# Patient Record
Sex: FEMALE | Race: WHITE | Employment: OTHER
[De-identification: names, ages, dates, MRNs, and addresses within clinical notes are randomized per-mention and may not be internally consistent; named-entity substitution may affect disease eponyms.]

---

## 2024-10-02 ENCOUNTER — APPOINTMENT (OUTPATIENT)
Facility: HOSPITAL | Age: 59
DRG: 872 | End: 2024-10-02
Payer: COMMERCIAL

## 2024-10-02 ENCOUNTER — HOSPITAL ENCOUNTER (INPATIENT)
Facility: HOSPITAL | Age: 59
LOS: 2 days | Discharge: HOME | DRG: 872 | End: 2024-10-04
Attending: EMERGENCY MEDICINE | Admitting: INTERNAL MEDICINE
Payer: COMMERCIAL

## 2024-10-02 DIAGNOSIS — A41.9 SEVERE SEPSIS (HCC): ICD-10-CM

## 2024-10-02 DIAGNOSIS — R11.2 NAUSEA VOMITING AND DIARRHEA: Primary | ICD-10-CM

## 2024-10-02 DIAGNOSIS — R19.7 NAUSEA VOMITING AND DIARRHEA: Primary | ICD-10-CM

## 2024-10-02 DIAGNOSIS — R10.84 GENERALIZED ABDOMINAL PAIN: ICD-10-CM

## 2024-10-02 DIAGNOSIS — R65.20 SEVERE SEPSIS (HCC): ICD-10-CM

## 2024-10-02 PROBLEM — K21.9 GASTROESOPHAGEAL REFLUX DISEASE WITHOUT ESOPHAGITIS: Status: ACTIVE | Noted: 2024-10-02

## 2024-10-02 PROBLEM — K52.9 ENTERITIS: Status: ACTIVE | Noted: 2024-10-02

## 2024-10-02 PROBLEM — E87.20 LACTIC ACIDOSIS: Status: ACTIVE | Noted: 2024-10-02

## 2024-10-02 LAB
ALBUMIN SERPL-MCNC: 3.7 G/DL (ref 3.4–5)
ALBUMIN/GLOB SERPL: 0.9 (ref 0.8–1.7)
ALP SERPL-CCNC: 89 U/L (ref 45–117)
ALT SERPL-CCNC: 37 U/L (ref 13–56)
ANION GAP SERPL CALC-SCNC: 11 MMOL/L (ref 3–18)
APPEARANCE UR: CLEAR
AST SERPL-CCNC: 25 U/L (ref 10–38)
BACTERIA URNS QL MICRO: ABNORMAL /HPF
BASOPHILS # BLD: 0 K/UL (ref 0–0.1)
BASOPHILS NFR BLD: 0 % (ref 0–2)
BILIRUB SERPL-MCNC: 1.9 MG/DL (ref 0.2–1)
BILIRUB UR QL: NEGATIVE
BUN SERPL-MCNC: 25 MG/DL (ref 7–18)
BUN/CREAT SERPL: 17 (ref 12–20)
CALCIUM SERPL-MCNC: 9.7 MG/DL (ref 8.5–10.1)
CHLORIDE SERPL-SCNC: 105 MMOL/L (ref 100–111)
CO2 SERPL-SCNC: 24 MMOL/L (ref 21–32)
COLOR UR: ABNORMAL
CREAT SERPL-MCNC: 1.44 MG/DL (ref 0.6–1.3)
DIFFERENTIAL METHOD BLD: ABNORMAL
EKG ATRIAL RATE: 108 BPM
EKG DIAGNOSIS: NORMAL
EKG P AXIS: 16 DEGREES
EKG P-R INTERVAL: 156 MS
EKG Q-T INTERVAL: 358 MS
EKG QRS DURATION: 72 MS
EKG QTC CALCULATION (BAZETT): 479 MS
EKG R AXIS: 38 DEGREES
EKG T AXIS: 63 DEGREES
EKG VENTRICULAR RATE: 108 BPM
EOSINOPHIL # BLD: 0 K/UL (ref 0–0.4)
EOSINOPHIL NFR BLD: 0 % (ref 0–5)
EPITH CASTS URNS QL MICRO: ABNORMAL /LPF (ref 0–5)
ERYTHROCYTE [DISTWIDTH] IN BLOOD BY AUTOMATED COUNT: 14.6 % (ref 11.6–14.5)
GLOBULIN SER CALC-MCNC: 3.9 G/DL (ref 2–4)
GLUCOSE SERPL-MCNC: 140 MG/DL (ref 74–99)
GLUCOSE UR STRIP.AUTO-MCNC: NEGATIVE MG/DL
HCT VFR BLD AUTO: 52.4 % (ref 35–45)
HGB BLD-MCNC: 17.7 G/DL (ref 12–16)
HGB UR QL STRIP: NEGATIVE
IMM GRANULOCYTES # BLD AUTO: 0 K/UL (ref 0–0.04)
IMM GRANULOCYTES NFR BLD AUTO: 0 % (ref 0–0.5)
KETONES UR QL STRIP.AUTO: NEGATIVE MG/DL
LACTATE BLD-SCNC: 4.14 MMOL/L (ref 0.4–2)
LACTATE BLD-SCNC: 5.09 MMOL/L (ref 0.4–2)
LACTATE SERPL-SCNC: 2.5 MMOL/L (ref 0.4–2)
LACTATE SERPL-SCNC: 3 MMOL/L (ref 0.4–2)
LACTATE SERPL-SCNC: 4.5 MMOL/L (ref 0.4–2)
LEUKOCYTE ESTERASE UR QL STRIP.AUTO: NEGATIVE
LIPASE SERPL-CCNC: 32 U/L (ref 13–75)
LYMPHOCYTES # BLD: 4.1 K/UL (ref 0.9–3.6)
LYMPHOCYTES NFR BLD: 14 % (ref 21–52)
MCH RBC QN AUTO: 30.2 PG (ref 24–34)
MCHC RBC AUTO-ENTMCNC: 33.8 G/DL (ref 31–37)
MCV RBC AUTO: 89.3 FL (ref 78–100)
MONOCYTES # BLD: 1.8 K/UL (ref 0.05–1.2)
MONOCYTES NFR BLD: 6 % (ref 3–10)
NEUTS BAND NFR BLD MANUAL: 6 %
NEUTS SEG # BLD: 23.3 K/UL (ref 1.8–8)
NEUTS SEG NFR BLD: 74 % (ref 40–73)
NITRITE UR QL STRIP.AUTO: NEGATIVE
NRBC # BLD: 0 K/UL (ref 0–0.01)
NRBC BLD-RTO: 0 PER 100 WBC
PH UR STRIP: 5 (ref 5–8)
PLATELET # BLD AUTO: 397 K/UL (ref 135–420)
PLATELET COMMENT: ABNORMAL
PMV BLD AUTO: 9.5 FL (ref 9.2–11.8)
POTASSIUM SERPL-SCNC: 3.8 MMOL/L (ref 3.5–5.5)
PROT SERPL-MCNC: 7.6 G/DL (ref 6.4–8.2)
PROT UR STRIP-MCNC: 30 MG/DL
RBC # BLD AUTO: 5.87 M/UL (ref 4.2–5.3)
RBC #/AREA URNS HPF: ABNORMAL /HPF (ref 0–5)
RBC MORPH BLD: ABNORMAL
SODIUM SERPL-SCNC: 140 MMOL/L (ref 136–145)
SP GR UR REFRACTOMETRY: >1.03 (ref 1–1.03)
UROBILINOGEN UR QL STRIP.AUTO: 0.2 EU/DL (ref 0.2–1)
WBC # BLD AUTO: 29.2 K/UL (ref 4.6–13.2)
WBC URNS QL MICRO: ABNORMAL /HPF (ref 0–4)

## 2024-10-02 PROCEDURE — 83605 ASSAY OF LACTIC ACID: CPT

## 2024-10-02 PROCEDURE — 94761 N-INVAS EAR/PLS OXIMETRY MLT: CPT

## 2024-10-02 PROCEDURE — 96375 TX/PRO/DX INJ NEW DRUG ADDON: CPT

## 2024-10-02 PROCEDURE — 81001 URINALYSIS AUTO W/SCOPE: CPT

## 2024-10-02 PROCEDURE — 1100000003 HC PRIVATE W/ TELEMETRY

## 2024-10-02 PROCEDURE — 36415 COLL VENOUS BLD VENIPUNCTURE: CPT

## 2024-10-02 PROCEDURE — 6370000000 HC RX 637 (ALT 250 FOR IP): Performed by: EMERGENCY MEDICINE

## 2024-10-02 PROCEDURE — 6370000000 HC RX 637 (ALT 250 FOR IP): Performed by: INTERNAL MEDICINE

## 2024-10-02 PROCEDURE — 2580000003 HC RX 258: Performed by: INTERNAL MEDICINE

## 2024-10-02 PROCEDURE — 6370000000 HC RX 637 (ALT 250 FOR IP): Performed by: PHYSICIAN ASSISTANT

## 2024-10-02 PROCEDURE — 80053 COMPREHEN METABOLIC PANEL: CPT

## 2024-10-02 PROCEDURE — 2580000003 HC RX 258: Performed by: STUDENT IN AN ORGANIZED HEALTH CARE EDUCATION/TRAINING PROGRAM

## 2024-10-02 PROCEDURE — 87324 CLOSTRIDIUM AG IA: CPT

## 2024-10-02 PROCEDURE — 6360000002 HC RX W HCPCS: Performed by: INTERNAL MEDICINE

## 2024-10-02 PROCEDURE — 6360000004 HC RX CONTRAST MEDICATION: Performed by: EMERGENCY MEDICINE

## 2024-10-02 PROCEDURE — 74177 CT ABD & PELVIS W/CONTRAST: CPT

## 2024-10-02 PROCEDURE — 87449 NOS EACH ORGANISM AG IA: CPT

## 2024-10-02 PROCEDURE — 2580000003 HC RX 258: Performed by: EMERGENCY MEDICINE

## 2024-10-02 PROCEDURE — 87506 IADNA-DNA/RNA PROBE TQ 6-11: CPT

## 2024-10-02 PROCEDURE — 93005 ELECTROCARDIOGRAM TRACING: CPT | Performed by: STUDENT IN AN ORGANIZED HEALTH CARE EDUCATION/TRAINING PROGRAM

## 2024-10-02 PROCEDURE — 99223 1ST HOSP IP/OBS HIGH 75: CPT | Performed by: SURGERY

## 2024-10-02 PROCEDURE — 6370000000 HC RX 637 (ALT 250 FOR IP): Performed by: STUDENT IN AN ORGANIZED HEALTH CARE EDUCATION/TRAINING PROGRAM

## 2024-10-02 PROCEDURE — 93010 ELECTROCARDIOGRAM REPORT: CPT | Performed by: INTERNAL MEDICINE

## 2024-10-02 PROCEDURE — 83690 ASSAY OF LIPASE: CPT

## 2024-10-02 PROCEDURE — 85025 COMPLETE CBC W/AUTO DIFF WBC: CPT

## 2024-10-02 PROCEDURE — 96365 THER/PROPH/DIAG IV INF INIT: CPT

## 2024-10-02 PROCEDURE — 82705 FATS/LIPIDS FECES QUAL: CPT

## 2024-10-02 PROCEDURE — 6360000002 HC RX W HCPCS: Performed by: EMERGENCY MEDICINE

## 2024-10-02 PROCEDURE — 89055 LEUKOCYTE ASSESSMENT FECAL: CPT

## 2024-10-02 PROCEDURE — 99285 EMERGENCY DEPT VISIT HI MDM: CPT

## 2024-10-02 PROCEDURE — 84999 UNLISTED CHEMISTRY PROCEDURE: CPT

## 2024-10-02 PROCEDURE — 2580000003 HC RX 258: Performed by: PHYSICIAN ASSISTANT

## 2024-10-02 PROCEDURE — 87086 URINE CULTURE/COLONY COUNT: CPT

## 2024-10-02 PROCEDURE — 87040 BLOOD CULTURE FOR BACTERIA: CPT

## 2024-10-02 PROCEDURE — 71045 X-RAY EXAM CHEST 1 VIEW: CPT

## 2024-10-02 RX ORDER — ONDANSETRON 2 MG/ML
4 INJECTION INTRAMUSCULAR; INTRAVENOUS
Status: ACTIVE | OUTPATIENT
Start: 2024-10-02 | End: 2024-10-02

## 2024-10-02 RX ORDER — ENOXAPARIN SODIUM 100 MG/ML
40 INJECTION SUBCUTANEOUS DAILY
Status: DISCONTINUED | OUTPATIENT
Start: 2024-10-03 | End: 2024-10-04 | Stop reason: HOSPADM

## 2024-10-02 RX ORDER — KETOROLAC TROMETHAMINE 15 MG/ML
15 INJECTION, SOLUTION INTRAMUSCULAR; INTRAVENOUS ONCE
Status: COMPLETED | OUTPATIENT
Start: 2024-10-02 | End: 2024-10-02

## 2024-10-02 RX ORDER — DICYCLOMINE HYDROCHLORIDE 10 MG/1
20 CAPSULE ORAL ONCE
Status: DISCONTINUED | OUTPATIENT
Start: 2024-10-02 | End: 2024-10-02

## 2024-10-02 RX ORDER — DICYCLOMINE HYDROCHLORIDE 10 MG/1
20 CAPSULE ORAL
Status: DISCONTINUED | OUTPATIENT
Start: 2024-10-02 | End: 2024-10-04 | Stop reason: HOSPADM

## 2024-10-02 RX ORDER — SODIUM CHLORIDE, SODIUM LACTATE, POTASSIUM CHLORIDE, AND CALCIUM CHLORIDE .6; .31; .03; .02 G/100ML; G/100ML; G/100ML; G/100ML
1000 INJECTION, SOLUTION INTRAVENOUS ONCE
Status: COMPLETED | OUTPATIENT
Start: 2024-10-02 | End: 2024-10-02

## 2024-10-02 RX ORDER — ONDANSETRON 2 MG/ML
4 INJECTION INTRAMUSCULAR; INTRAVENOUS EVERY 6 HOURS PRN
Status: DISCONTINUED | OUTPATIENT
Start: 2024-10-02 | End: 2024-10-04 | Stop reason: HOSPADM

## 2024-10-02 RX ORDER — METOCLOPRAMIDE HYDROCHLORIDE 5 MG/ML
10 INJECTION INTRAMUSCULAR; INTRAVENOUS
Status: COMPLETED | OUTPATIENT
Start: 2024-10-02 | End: 2024-10-02

## 2024-10-02 RX ORDER — ACETAMINOPHEN 325 MG/1
650 TABLET ORAL EVERY 6 HOURS PRN
Status: DISCONTINUED | OUTPATIENT
Start: 2024-10-02 | End: 2024-10-04 | Stop reason: HOSPADM

## 2024-10-02 RX ORDER — MIDODRINE HYDROCHLORIDE 10 MG/1
10 TABLET ORAL ONCE
Status: COMPLETED | OUTPATIENT
Start: 2024-10-02 | End: 2024-10-02

## 2024-10-02 RX ORDER — 0.9 % SODIUM CHLORIDE 0.9 %
1000 INTRAVENOUS SOLUTION INTRAVENOUS ONCE
Status: COMPLETED | OUTPATIENT
Start: 2024-10-02 | End: 2024-10-02

## 2024-10-02 RX ORDER — ONDANSETRON 2 MG/ML
4 INJECTION INTRAMUSCULAR; INTRAVENOUS
Status: COMPLETED | OUTPATIENT
Start: 2024-10-02 | End: 2024-10-02

## 2024-10-02 RX ORDER — SODIUM CHLORIDE 9 MG/ML
INJECTION, SOLUTION INTRAVENOUS PRN
Status: DISCONTINUED | OUTPATIENT
Start: 2024-10-02 | End: 2024-10-04 | Stop reason: HOSPADM

## 2024-10-02 RX ORDER — SODIUM CHLORIDE, SODIUM LACTATE, POTASSIUM CHLORIDE, CALCIUM CHLORIDE 600; 310; 30; 20 MG/100ML; MG/100ML; MG/100ML; MG/100ML
INJECTION, SOLUTION INTRAVENOUS CONTINUOUS
Status: DISCONTINUED | OUTPATIENT
Start: 2024-10-02 | End: 2024-10-04

## 2024-10-02 RX ORDER — SODIUM CHLORIDE, SODIUM LACTATE, POTASSIUM CHLORIDE, AND CALCIUM CHLORIDE .6; .31; .03; .02 G/100ML; G/100ML; G/100ML; G/100ML
500 INJECTION, SOLUTION INTRAVENOUS ONCE
Status: DISCONTINUED | OUTPATIENT
Start: 2024-10-02 | End: 2024-10-03

## 2024-10-02 RX ORDER — IOPAMIDOL 612 MG/ML
70 INJECTION, SOLUTION INTRAVASCULAR
Status: COMPLETED | OUTPATIENT
Start: 2024-10-02 | End: 2024-10-02

## 2024-10-02 RX ORDER — SODIUM CHLORIDE 0.9 % (FLUSH) 0.9 %
5-40 SYRINGE (ML) INJECTION EVERY 12 HOURS SCHEDULED
Status: DISCONTINUED | OUTPATIENT
Start: 2024-10-02 | End: 2024-10-04 | Stop reason: HOSPADM

## 2024-10-02 RX ORDER — POTASSIUM CHLORIDE 7.45 MG/ML
10 INJECTION INTRAVENOUS PRN
Status: DISCONTINUED | OUTPATIENT
Start: 2024-10-02 | End: 2024-10-04 | Stop reason: HOSPADM

## 2024-10-02 RX ORDER — METOCLOPRAMIDE HYDROCHLORIDE 5 MG/ML
10 INJECTION INTRAMUSCULAR; INTRAVENOUS EVERY 6 HOURS
Status: DISCONTINUED | OUTPATIENT
Start: 2024-10-02 | End: 2024-10-04 | Stop reason: HOSPADM

## 2024-10-02 RX ORDER — ONDANSETRON 4 MG/1
4 TABLET, ORALLY DISINTEGRATING ORAL
Status: COMPLETED | OUTPATIENT
Start: 2024-10-02 | End: 2024-10-02

## 2024-10-02 RX ORDER — MAGNESIUM SULFATE IN WATER 40 MG/ML
2000 INJECTION, SOLUTION INTRAVENOUS PRN
Status: DISCONTINUED | OUTPATIENT
Start: 2024-10-02 | End: 2024-10-04 | Stop reason: HOSPADM

## 2024-10-02 RX ORDER — SODIUM CHLORIDE 0.9 % (FLUSH) 0.9 %
5-40 SYRINGE (ML) INJECTION PRN
Status: DISCONTINUED | OUTPATIENT
Start: 2024-10-02 | End: 2024-10-04 | Stop reason: HOSPADM

## 2024-10-02 RX ORDER — LEVOFLOXACIN 500 MG/1
500 TABLET, FILM COATED ORAL EVERY 24 HOURS
Status: DISCONTINUED | OUTPATIENT
Start: 2024-10-02 | End: 2024-10-04 | Stop reason: HOSPADM

## 2024-10-02 RX ORDER — ACETAMINOPHEN 650 MG/1
650 SUPPOSITORY RECTAL EVERY 6 HOURS PRN
Status: DISCONTINUED | OUTPATIENT
Start: 2024-10-02 | End: 2024-10-04 | Stop reason: HOSPADM

## 2024-10-02 RX ORDER — POLYETHYLENE GLYCOL 3350 17 G/17G
17 POWDER, FOR SOLUTION ORAL DAILY PRN
Status: DISCONTINUED | OUTPATIENT
Start: 2024-10-02 | End: 2024-10-04 | Stop reason: HOSPADM

## 2024-10-02 RX ORDER — ONDANSETRON 4 MG/1
4 TABLET, ORALLY DISINTEGRATING ORAL EVERY 8 HOURS PRN
Status: DISCONTINUED | OUTPATIENT
Start: 2024-10-02 | End: 2024-10-04 | Stop reason: HOSPADM

## 2024-10-02 RX ADMIN — PANTOPRAZOLE SODIUM 40 MG: 40 INJECTION, POWDER, FOR SOLUTION INTRAVENOUS at 13:28

## 2024-10-02 RX ADMIN — SODIUM CHLORIDE, POTASSIUM CHLORIDE, SODIUM LACTATE AND CALCIUM CHLORIDE: 600; 310; 30; 20 INJECTION, SOLUTION INTRAVENOUS at 21:22

## 2024-10-02 RX ADMIN — SODIUM CHLORIDE 1000 ML: 9 INJECTION, SOLUTION INTRAVENOUS at 04:55

## 2024-10-02 RX ADMIN — METOCLOPRAMIDE 10 MG: 5 INJECTION, SOLUTION INTRAMUSCULAR; INTRAVENOUS at 05:28

## 2024-10-02 RX ADMIN — DICYCLOMINE HYDROCHLORIDE 20 MG: 10 CAPSULE ORAL at 21:18

## 2024-10-02 RX ADMIN — ONDANSETRON 4 MG: 4 TABLET, ORALLY DISINTEGRATING ORAL at 02:52

## 2024-10-02 RX ADMIN — PIPERACILLIN AND TAZOBACTAM 4500 MG: 4; .5 INJECTION, POWDER, FOR SOLUTION INTRAVENOUS at 07:33

## 2024-10-02 RX ADMIN — SODIUM CHLORIDE, POTASSIUM CHLORIDE, SODIUM LACTATE AND CALCIUM CHLORIDE 1000 ML: 600; 310; 30; 20 INJECTION, SOLUTION INTRAVENOUS at 23:07

## 2024-10-02 RX ADMIN — MIDODRINE HYDROCHLORIDE 10 MG: 10 TABLET ORAL at 09:28

## 2024-10-02 RX ADMIN — DICYCLOMINE HYDROCHLORIDE 20 MG: 10 CAPSULE ORAL at 13:36

## 2024-10-02 RX ADMIN — SODIUM CHLORIDE, POTASSIUM CHLORIDE, SODIUM LACTATE AND CALCIUM CHLORIDE 1000 ML: 600; 310; 30; 20 INJECTION, SOLUTION INTRAVENOUS at 09:28

## 2024-10-02 RX ADMIN — ONDANSETRON 4 MG: 2 INJECTION INTRAMUSCULAR; INTRAVENOUS at 03:35

## 2024-10-02 RX ADMIN — SODIUM CHLORIDE, PRESERVATIVE FREE 10 ML: 5 INJECTION INTRAVENOUS at 21:19

## 2024-10-02 RX ADMIN — ALUMINUM HYDROXIDE, MAGNESIUM HYDROXIDE, AND SIMETHICONE 40 ML: 1200; 120; 1200 SUSPENSION ORAL at 05:26

## 2024-10-02 RX ADMIN — METOCLOPRAMIDE HYDROCHLORIDE 10 MG: 5 INJECTION INTRAMUSCULAR; INTRAVENOUS at 21:18

## 2024-10-02 RX ADMIN — METOCLOPRAMIDE HYDROCHLORIDE 10 MG: 5 INJECTION INTRAMUSCULAR; INTRAVENOUS at 13:42

## 2024-10-02 RX ADMIN — SODIUM CHLORIDE, POTASSIUM CHLORIDE, SODIUM LACTATE AND CALCIUM CHLORIDE: 600; 310; 30; 20 INJECTION, SOLUTION INTRAVENOUS at 23:58

## 2024-10-02 RX ADMIN — IOPAMIDOL 70 ML: 612 INJECTION, SOLUTION INTRAVENOUS at 06:08

## 2024-10-02 RX ADMIN — SODIUM CHLORIDE, POTASSIUM CHLORIDE, SODIUM LACTATE AND CALCIUM CHLORIDE 1000 ML: 600; 310; 30; 20 INJECTION, SOLUTION INTRAVENOUS at 07:32

## 2024-10-02 RX ADMIN — WATER 2000 MG: 1 INJECTION INTRAMUSCULAR; INTRAVENOUS; SUBCUTANEOUS at 13:31

## 2024-10-02 RX ADMIN — LEVOFLOXACIN 500 MG: 500 TABLET, FILM COATED ORAL at 13:37

## 2024-10-02 RX ADMIN — SODIUM CHLORIDE, POTASSIUM CHLORIDE, SODIUM LACTATE AND CALCIUM CHLORIDE: 600; 310; 30; 20 INJECTION, SOLUTION INTRAVENOUS at 13:46

## 2024-10-02 RX ADMIN — SODIUM CHLORIDE 1000 ML: 9 INJECTION, SOLUTION INTRAVENOUS at 03:15

## 2024-10-02 RX ADMIN — KETOROLAC TROMETHAMINE 15 MG: 15 INJECTION, SOLUTION INTRAMUSCULAR; INTRAVENOUS at 03:35

## 2024-10-02 ASSESSMENT — PAIN SCALES - GENERAL
PAINLEVEL_OUTOF10: 0
PAINLEVEL_OUTOF10: 8
PAINLEVEL_OUTOF10: 2
PAINLEVEL_OUTOF10: 5

## 2024-10-02 ASSESSMENT — PAIN DESCRIPTION - LOCATION
LOCATION: ABDOMEN

## 2024-10-02 NOTE — ED TRIAGE NOTES
Patient arrived EMS from home with c/o n/v/d since 1800 yesterday. Per EMS patient ate oysters Monday night. Bg 132pta. Upon arrival patient alert and oriented x 4. Denies CP, SOB. Afebrile.

## 2024-10-02 NOTE — CONSULTS
General Surgery Consult    Arlene Fuentes  Admit date: 10/2/2024    MRN: [id number]     : 1965     Age: 59 y.o.        Attending Physician: Pamela Yancey MD, FACS      History of Present Illness:      Arlene Fuentes is a 59 y.o. female who I was consulted by the emergency room for evaluation of abdominal pain and picture of severe enteritis.  The patient stated that has been having abdominal pain for 24 hours.  She said that the pain is generalized and crampy.  In the ER she was found to have tachycardia and severe leukocytosis and elevated lactate and she had a CT scan of abdomen pelvis that showed significant thickening of the mid jejunum and terminal ileum representing severe enteritis.  The patient will be admitted to either ICU or stepdown unit on the medicine service but were asked to be on board in case the patient get worse or if there is evidence of ischemia.  When I saw the patient in the ER she stated that she feels slightly better but she still having significant abdominal pain.    There are no problems to display for this patient.    History reviewed. No pertinent past medical history.   History reviewed. No pertinent surgical history.   Social History     Tobacco Use    Smoking status: Not on file    Smokeless tobacco: Not on file   Substance Use Topics    Alcohol use: Not on file      Social History     Tobacco Use   Smoking Status Not on file   Smokeless Tobacco Not on file     History reviewed. No pertinent family history.   Current Facility-Administered Medications   Medication Dose Route Frequency    ondansetron (ZOFRAN) injection 4 mg  4 mg IntraVENous NOW    lactated ringers bolus 1,000 mL  1,000 mL IntraVENous Once     No current outpatient medications on file.      No Known Allergies     Review of Systems:  Constitutional: negative  Eyes: negative  Ears, Nose, Mouth, Throat, and Face: negative  Respiratory: negative  Cardiovascular: negative  Gastrointestinal: positive for

## 2024-10-02 NOTE — ED NOTES
TRANSFER - OUT REPORT:    Verbal report given to BRENDEN Baez on Arlene Fuentes  being transferred to  for routine progression of patient care       Report consisted of patient's Situation, Background, Assessment and   Recommendations(SBAR).     Information from the following report(s) ED SBAR, Adult Overview, and Recent Results was reviewed with the receiving nurse.    [facility] Fall Assessment:    Presents to emergency department  because of falls (Syncope, seizure, or loss of consciousness): No  Age > 70: No  Altered Mental Status, Intoxication with alcohol or substance confusion (Disorientation, impaired judgment, poor safety awaremess, or inability to follow instructions): No  Impaired Mobility: Ambulates or transfers with assistive devices or assistance; Unable to ambulate or transer.: No  Nursing Judgement: No          Lines:   Peripheral IV 10/02/24 Left Antecubital (Active)        Opportunity for questions and clarification was provided.      Patient transported with:  Tech

## 2024-10-02 NOTE — ED PROVIDER NOTES
6:03 AM  Dr. Adams taken for patient care.  Patient with symptoms of nausea, vomiting, diarrhea that began yesterday after eating oysters.  Endorsing abdominal cramping. Upon review patient found to have a leukocytosis of 29.2, hemoglobin 17.7, and creatinine 1.44 without known baseline.  Patient's blood pressures found to be soft while here in the emergency department.  She is also tachycardic.  Patient with history of C. difficile and therefore placed C. difficile assay for further evaluation as etiology.  Also will obtain a CT scan due to her leukocytosis and meeting sepsis criteria.  Believe patient would benefit from inpatient treatment and evaluation.  Will continue to monitor patient.      8:23 AM  Patient CT scan of my read without evidence of small bowel structure.  Radiology read showing inflammation to a large portion of the small bowel consistent with enteritis.  Spoke with hospitalist who believes patient should be possibly ICU admission due to softer blood pressures and concerning symptoms/CT scan findings.  Will reach out to ICU.    8:53 AM  Spoke to ICU who recommends further IV fluid resuscitation and consults to ID and general surgery due to CT scan findings and significant leukocytosis.  Will reach out to both for further recommendation.    09:06 AM  Spoke with general surgery who will consult upon admission.  No further recommendations at this time.    11:01 AM  Patient's blood pressure following third liter of IV fluid as well as midodrine has been stable with systolic greater than 100 over the past hour.  Updated ICU who believes patient appropriate for stepdown.  Will admit patient to the hospital for further treatment and evaluation.    11:30 AM  Spoke with ID who will be placing new IV antibiotic orders at this time.  Spoke with hospitalist who agrees to admit patient at this time.        Critical Care Time:  The services I provided to this patient were to treat and/or prevent clinically 
injection 4 mg (4 mg IntraVENous Given 10/2/24 0335)   ketorolac (TORADOL) injection 15 mg (15 mg IntraVENous Given 10/2/24 0335)   sodium chloride 0.9 % bolus 1,000 mL (0 mLs IntraVENous Stopped 10/2/24 0619)   metoclopramide (REGLAN) injection 10 mg (10 mg IntraVENous Given 10/2/24 0528)   mylanta/viscous lidocaine (GI COCKTAIL) (40 mLs Oral Given 10/2/24 0526)   iopamidol (ISOVUE-300) 61 % injection 70 mL (70 mLs IntraVENous Given 10/2/24 0608)       Final Diagnosis:  1. Nausea vomiting and diarrhea    2. SIRS (systemic inflammatory response syndrome) (HCC)    3. Generalized abdominal pain        Disposition:  Destination: Probable admission    Discharge Rx:   New Prescriptions    No medications on file         Dictation disclaimer: Please note that this dictation was completed with [email or web address], the [employer] voice recognition software. Quite often unanticipated grammatical, syntax, homophones, and other interpretive errors are inadvertently transcribed by the computer software. Please disregard these errors. Please excuse any errors that have escaped final proofreading.     Von Grace D.O.  Emergency Physician   [facility]             [place]on Grace,   10/02/24 0645    
PAST MEDICAL HISTORY:  ASHD (arteriosclerotic heart disease)     BPH (benign prostatic hyperplasia)     CAD (coronary artery disease)     CKD (chronic kidney disease)     Diabetes Mellitus Type II, Uncontrolled     Diabetic retinopathy     Dyslipidemia     GERD (gastroesophageal reflux disease)     Gout     Hepatitis     HTN (hypertension)     Ischemic cardiomyopathy     Myocardial infarction     Nephrolithiasis     Pulmonary hypertension     s/p Angioplasty with Stent     Vitamin D deficiency

## 2024-10-02 NOTE — ED NOTES
Started an IV, via ultrasound guidance   Sent blood work to lab    Attempted to connected pt to monitor. Pt refused. Pt stated that she is in a lot of pain and is very dry. Nurse notified

## 2024-10-02 NOTE — CONSULTS
[facility]  (A Division of [facility])      Consultation Note      Date of Admission: 10/2/2024    Date of Note: 10/2/2024      Reason for Referral: sepsis, enteritis  Referring Physician: Dr. Bryan Melvin from this admission:   10/2 blood cultures: Pending  10/2 C. difficile: Pending  10/2 enteric pathogen panel: Pending  10/2 urine culture: Sent    Current Antimicrobials:    Prior Antimicrobials:  Zosyn 10/2        Assessment:         Severe sepsis with lactic acidosis: Likely GI in origin; lactic acid greater than 5, WBC 29.2  Small bowel enteritis: 10/2 CT abdomen and pelvis shows long length of small bowel wall thickening and inflammatory changes spanning from the mid jejunum to the terminal ileum representing severe small bowel enteritis.  Cholelithiasis without cholecystitis, small volume ascites  RUBINA: Presenting creatinine 1.44  Hyperbilirubinemia: 1.9 on presentation no associated jaundice    Plan:   D/c zosyn    Start Ceftriaxone + levofloxacin presuming possible vibrio from ingesting raw oysters preceeding current admission.    Enteric pathogen panel  C.diff given prior hx of C.diff.     Trend CBC, CMP    Surgery has been consulted- no plans for surgical intervention.    Discussed with Dr. Bryan Vázquez DO  [facility]  [address], [address], [place] [address]  Office: [phone], Ext [phone]      Lines / Catheters:  Peripheral    HPI:  Ms. Fuentes is a 59-year-old female with a past medical history of GERD and obesity who is coming into the emergency department with a rather sudden onset of abdominal pain with nausea and vomiting and diarrhea.  She is on vacation from [place] and was in a hotel at the [facility].  She had been eating oysters on 9/30 and then subsequently developed this these episodes of abdominal pain and diarrhea the following day.  No other family members have similar symptoms

## 2024-10-02 NOTE — ED NOTES
Assumed care of patient, patient given abx and fluids as ordered, denies other needs at this time.

## 2024-10-02 NOTE — H&P
History & Physical    Patient: Arlene Fuentes MRN: [id number]  [facility]: [id number]    YOB: 1965  Age: 59 y.o.  Sex: female      DOA: 10/2/2024    Chief Complaint:   Chief Complaint   Patient presents with    Nausea          HPI:     Arlene Fuentes is a 59 y.o.  female with a past medical history of GERD, obesity who presents today for abdominal pain, nausea, vomiting and diarrhea.  Patient notes she is visiting from [place] where she receives all her medical care.  Patient reports she was staying at the [facility] and had oysters on 9/30/2024 and then she developed nausea vomiting diarrhea on 10/1/2024 in the afternoon.  Her symptoms have continued to progress and this is never happened before.  She reports no evidence of blood in the vomit or stool.  Patient reports she has enjoyed oysters in the past with no issues.    Psx: patient states none  ALL: NKDA  Meds: None  SH: quit smoking 7 years ago, drinks 2 glasses of wine a day, no drugs. Lives in [place] and has adult children  Family history: Uterine cancer in mother, breast cancer in sister    In the ED it was noted the patient was afebrile, a respiratory rate of 18-22, a heart rate of 10 4-1 07, a blood pressure of 88/68 and the patient was 94% on room air.  Labs were notable for a creatinine of 1.44, a BUN of 25, a glucose of 140, a lactic acid of 5.09, a total bilirubin of 1.9, a white blood cell count of 29.2, hemoglobin of 17.7.  CT scan showed small bowel wall thickening with surrounding inflammatory change which spanned from the mid jejunum through the terminal ileum and small bowel enteritis, small volume ascites, cholelithiasis.  EKG showed sinus tachycardia but was otherwise normal.  Patient given Toradol, LR, Levaquin, Reglan, midodrine, GI cocktail, Zofran, Zosyn and admitted for further workup.    Social History     Socioeconomic History    Marital status: Single     Spouse name: None    Number of children: None    Years of

## 2024-10-03 LAB
ALBUMIN SERPL-MCNC: 2.4 G/DL (ref 3.4–5)
ALBUMIN/GLOB SERPL: 0.8 (ref 0.8–1.7)
ALP SERPL-CCNC: 55 U/L (ref 45–117)
ALT SERPL-CCNC: 52 U/L (ref 13–56)
ANION GAP SERPL CALC-SCNC: 6 MMOL/L (ref 3–18)
AST SERPL-CCNC: 42 U/L (ref 10–38)
BASOPHILS # BLD: 0 K/UL (ref 0–0.1)
BASOPHILS NFR BLD: 0 % (ref 0–2)
BILIRUB SERPL-MCNC: 0.7 MG/DL (ref 0.2–1)
BUN SERPL-MCNC: 25 MG/DL (ref 7–18)
BUN/CREAT SERPL: 27 (ref 12–20)
C COLI+JEJUNI TUF STL QL NAA+PROBE: NEGATIVE
C DIFF GDH STL QL: NEGATIVE
C DIFF TOX A+B STL QL IA: NEGATIVE
C DIFF TOXIN INTERPRETATION: NORMAL
CALCIUM SERPL-MCNC: 8 MG/DL (ref 8.5–10.1)
CHLORIDE SERPL-SCNC: 107 MMOL/L (ref 100–111)
CO2 SERPL-SCNC: 25 MMOL/L (ref 21–32)
CREAT SERPL-MCNC: 0.92 MG/DL (ref 0.6–1.3)
DIFFERENTIAL METHOD BLD: ABNORMAL
EC STX1+STX2 GENES STL QL NAA+PROBE: NEGATIVE
EOSINOPHIL # BLD: 0 K/UL (ref 0–0.4)
EOSINOPHIL NFR BLD: 0 % (ref 0–5)
ERYTHROCYTE [DISTWIDTH] IN BLOOD BY AUTOMATED COUNT: 15.1 % (ref 11.6–14.5)
ETEC ELTA+ESTB GENES STL QL NAA+PROBE: NEGATIVE
GLOBULIN SER CALC-MCNC: 3.1 G/DL (ref 2–4)
GLUCOSE SERPL-MCNC: 123 MG/DL (ref 74–99)
HCT VFR BLD AUTO: 41.1 % (ref 35–45)
HGB BLD-MCNC: 13.9 G/DL (ref 12–16)
IMM GRANULOCYTES # BLD AUTO: 0.1 K/UL (ref 0–0.04)
IMM GRANULOCYTES NFR BLD AUTO: 0 % (ref 0–0.5)
LACTATE SERPL-SCNC: 1.6 MMOL/L (ref 0.4–2)
LACTATE SERPL-SCNC: 2 MMOL/L (ref 0.4–2)
LACTATE SERPL-SCNC: 2 MMOL/L (ref 0.4–2)
LYMPHOCYTES # BLD: 3.8 K/UL (ref 0.9–3.6)
LYMPHOCYTES NFR BLD: 22 % (ref 21–52)
MCH RBC QN AUTO: 30.7 PG (ref 24–34)
MCHC RBC AUTO-ENTMCNC: 33.8 G/DL (ref 31–37)
MCV RBC AUTO: 90.7 FL (ref 78–100)
MONOCYTES # BLD: 1.3 K/UL (ref 0.05–1.2)
MONOCYTES NFR BLD: 8 % (ref 3–10)
NEUTS SEG # BLD: 12.2 K/UL (ref 1.8–8)
NEUTS SEG NFR BLD: 70 % (ref 40–73)
NRBC # BLD: 0 K/UL (ref 0–0.01)
NRBC BLD-RTO: 0 PER 100 WBC
P SHIGELLOIDES DNA STL QL NAA+PROBE: NEGATIVE
PLATELET # BLD AUTO: 305 K/UL (ref 135–420)
PMV BLD AUTO: 9.4 FL (ref 9.2–11.8)
POTASSIUM SERPL-SCNC: 3.8 MMOL/L (ref 3.5–5.5)
PROT SERPL-MCNC: 5.5 G/DL (ref 6.4–8.2)
RBC # BLD AUTO: 4.53 M/UL (ref 4.2–5.3)
SALMONELLA SP SPAO STL QL NAA+PROBE: NEGATIVE
SHIGELLA SP+EIEC IPAH STL QL NAA+PROBE: NEGATIVE
SODIUM SERPL-SCNC: 138 MMOL/L (ref 136–145)
V CHOL+PARA+VUL DNA STL QL NAA+NON-PROBE: POSITIVE
WBC # BLD AUTO: 17.4 K/UL (ref 4.6–13.2)
WBC #/AREA STL HPF: NORMAL /HPF (ref 0–4)
Y ENTEROCOL DNA STL QL NAA+NON-PROBE: NEGATIVE

## 2024-10-03 PROCEDURE — 85025 COMPLETE CBC W/AUTO DIFF WBC: CPT

## 2024-10-03 PROCEDURE — 6370000000 HC RX 637 (ALT 250 FOR IP): Performed by: STUDENT IN AN ORGANIZED HEALTH CARE EDUCATION/TRAINING PROGRAM

## 2024-10-03 PROCEDURE — 99233 SBSQ HOSP IP/OBS HIGH 50: CPT | Performed by: SURGERY

## 2024-10-03 PROCEDURE — 83605 ASSAY OF LACTIC ACID: CPT

## 2024-10-03 PROCEDURE — 36415 COLL VENOUS BLD VENIPUNCTURE: CPT

## 2024-10-03 PROCEDURE — 1100000003 HC PRIVATE W/ TELEMETRY

## 2024-10-03 PROCEDURE — 2580000003 HC RX 258: Performed by: INTERNAL MEDICINE

## 2024-10-03 PROCEDURE — 6360000002 HC RX W HCPCS: Performed by: INTERNAL MEDICINE

## 2024-10-03 PROCEDURE — 6370000000 HC RX 637 (ALT 250 FOR IP): Performed by: INTERNAL MEDICINE

## 2024-10-03 PROCEDURE — 80053 COMPREHEN METABOLIC PANEL: CPT

## 2024-10-03 RX ORDER — LOPERAMIDE HCL 2 MG
2 CAPSULE ORAL 4 TIMES DAILY PRN
Status: DISCONTINUED | OUTPATIENT
Start: 2024-10-03 | End: 2024-10-04 | Stop reason: HOSPADM

## 2024-10-03 RX ORDER — IBUPROFEN 400 MG/1
400 TABLET, FILM COATED ORAL EVERY 12 HOURS PRN
Status: DISCONTINUED | OUTPATIENT
Start: 2024-10-03 | End: 2024-10-04 | Stop reason: HOSPADM

## 2024-10-03 RX ORDER — MORPHINE SULFATE 2 MG/ML
1 INJECTION, SOLUTION INTRAMUSCULAR; INTRAVENOUS EVERY 4 HOURS PRN
Status: DISCONTINUED | OUTPATIENT
Start: 2024-10-03 | End: 2024-10-03

## 2024-10-03 RX ADMIN — WATER 2000 MG: 1 INJECTION INTRAMUSCULAR; INTRAVENOUS; SUBCUTANEOUS at 12:11

## 2024-10-03 RX ADMIN — METOCLOPRAMIDE HYDROCHLORIDE 10 MG: 5 INJECTION INTRAMUSCULAR; INTRAVENOUS at 10:32

## 2024-10-03 RX ADMIN — ENOXAPARIN SODIUM 40 MG: 100 INJECTION SUBCUTANEOUS at 10:32

## 2024-10-03 RX ADMIN — SODIUM CHLORIDE, PRESERVATIVE FREE 10 ML: 5 INJECTION INTRAVENOUS at 21:10

## 2024-10-03 RX ADMIN — SODIUM CHLORIDE, PRESERVATIVE FREE 10 ML: 5 INJECTION INTRAVENOUS at 10:33

## 2024-10-03 RX ADMIN — IBUPROFEN 400 MG: 400 TABLET, FILM COATED ORAL at 01:05

## 2024-10-03 RX ADMIN — METOCLOPRAMIDE HYDROCHLORIDE 10 MG: 5 INJECTION INTRAMUSCULAR; INTRAVENOUS at 02:44

## 2024-10-03 RX ADMIN — DICYCLOMINE HYDROCHLORIDE 20 MG: 10 CAPSULE ORAL at 05:50

## 2024-10-03 RX ADMIN — PANTOPRAZOLE SODIUM 40 MG: 40 INJECTION, POWDER, FOR SOLUTION INTRAVENOUS at 10:33

## 2024-10-03 RX ADMIN — SODIUM CHLORIDE, POTASSIUM CHLORIDE, SODIUM LACTATE AND CALCIUM CHLORIDE: 600; 310; 30; 20 INJECTION, SOLUTION INTRAVENOUS at 20:31

## 2024-10-03 RX ADMIN — SODIUM CHLORIDE, POTASSIUM CHLORIDE, SODIUM LACTATE AND CALCIUM CHLORIDE: 600; 310; 30; 20 INJECTION, SOLUTION INTRAVENOUS at 10:32

## 2024-10-03 RX ADMIN — LOPERAMIDE HYDROCHLORIDE 2 MG: 2 CAPSULE ORAL at 12:11

## 2024-10-03 RX ADMIN — LEVOFLOXACIN 500 MG: 500 TABLET, FILM COATED ORAL at 12:11

## 2024-10-03 RX ADMIN — DICYCLOMINE HYDROCHLORIDE 20 MG: 10 CAPSULE ORAL at 10:34

## 2024-10-03 ASSESSMENT — PAIN DESCRIPTION - DESCRIPTORS: DESCRIPTORS: ACHING

## 2024-10-03 ASSESSMENT — PAIN SCALES - GENERAL
PAINLEVEL_OUTOF10: 0
PAINLEVEL_OUTOF10: 3
PAINLEVEL_OUTOF10: 0

## 2024-10-03 ASSESSMENT — PAIN DESCRIPTION - LOCATION: LOCATION: ABDOMEN

## 2024-10-03 ASSESSMENT — PAIN DESCRIPTION - ORIENTATION: ORIENTATION: ANTERIOR

## 2024-10-03 NOTE — PLAN OF CARE
Problem: Gastrointestinal - Adult  Goal: Minimal or absence of nausea and vomiting  Outcome: Progressing  Note: Monitor intake and output

## 2024-10-03 NOTE — CARE COORDINATION
10/03/24 0936   Service Assessment   Patient Orientation Alert and Oriented   Cognition Alert   History Provided By Patient   Primary Caregiver Self   Accompanied By/Relationship No one at bedside   Support Systems Family Members   Patient's Healthcare Decision Maker is: Patient Declined (Legal Next of Kin Remains as Decision Maker)   PCP Verified by CM No  (Patient does not have a PCP)   Prior Functional Level Independent in ADLs/IADLs   Current Functional Level Independent in ADLs/IADLs   Ability to make needs known: Good   Family able to assist with home care needs: Yes   Would you like for me to discuss the discharge plan with any other family members/significant others, and if so, who? Yes  (Cynthia- daughter)   Financial Resources None   Community Resources None   CM/SW Referral Other (see comment)  (Not applicable)   Social/Functional History   Lives With Alone   Type of Home House   Home Layout One level   Home Access Level entry   Bathroom Shower/Tub Walk-in shower   Bathroom Toilet Standard   Bathroom Equipment Built-in shower seat   Bathroom Accessibility Accessible   Home Equipment None   Receives Help From Family   ADL Assistance Independent   Homemaking Assistance Independent   Homemaking Responsibilities Yes   Ambulation Assistance Independent   Transfer Assistance Independent   Active  Yes   Mode of Transportation Car   Education Not applicable   Occupation Full time employment   Type of Occupation    Discharge Planning   Type of Residence House   Living Arrangements Alone   Current Services Prior To Admission None   Potential Assistance Needed N/A   DME Ordered? No   Type of Home Care Services None   Patient expects to be discharged to: House   Follow Up Appointment: Best Day/Time  Monday AM   One/Two Story Residence One story   History of falls? 0   Services At/After Discharge   Transition of Care Consult (CM Consult) N/A   Services At/After Discharge None   [place]

## 2024-10-03 NOTE — PLAN OF CARE
Problem: Genitourinary - Adult  Goal: Absence of urinary retention  Note: Pt has not problem with retention at this time

## 2024-10-04 VITALS
HEART RATE: 78 BPM | RESPIRATION RATE: 16 BRPM | SYSTOLIC BLOOD PRESSURE: 121 MMHG | HEIGHT: 65 IN | BODY MASS INDEX: 36.14 KG/M2 | WEIGHT: 216.93 LBS | OXYGEN SATURATION: 95 % | TEMPERATURE: 98.2 F | DIASTOLIC BLOOD PRESSURE: 74 MMHG

## 2024-10-04 PROBLEM — R10.84 GENERALIZED ABDOMINAL PAIN: Status: RESOLVED | Noted: 2024-10-02 | Resolved: 2024-10-04

## 2024-10-04 PROBLEM — K52.9 ENTERITIS: Status: RESOLVED | Noted: 2024-10-02 | Resolved: 2024-10-04

## 2024-10-04 PROBLEM — R11.2 NAUSEA VOMITING AND DIARRHEA: Status: RESOLVED | Noted: 2024-10-02 | Resolved: 2024-10-04

## 2024-10-04 PROBLEM — A41.9 SEVERE SEPSIS (HCC): Status: RESOLVED | Noted: 2024-10-02 | Resolved: 2024-10-04

## 2024-10-04 PROBLEM — R19.7 NAUSEA VOMITING AND DIARRHEA: Status: RESOLVED | Noted: 2024-10-02 | Resolved: 2024-10-04

## 2024-10-04 PROBLEM — K21.9 GASTROESOPHAGEAL REFLUX DISEASE WITHOUT ESOPHAGITIS: Status: RESOLVED | Noted: 2024-10-02 | Resolved: 2024-10-04

## 2024-10-04 PROBLEM — R65.20 SEVERE SEPSIS (HCC): Status: RESOLVED | Noted: 2024-10-02 | Resolved: 2024-10-04

## 2024-10-04 PROBLEM — E87.20 LACTIC ACIDOSIS: Status: RESOLVED | Noted: 2024-10-02 | Resolved: 2024-10-04

## 2024-10-04 LAB
ALBUMIN SERPL-MCNC: 2.6 G/DL (ref 3.4–5)
ALBUMIN/GLOB SERPL: 0.9 (ref 0.8–1.7)
ALP SERPL-CCNC: 56 U/L (ref 45–117)
ALT SERPL-CCNC: 34 U/L (ref 13–56)
ANION GAP SERPL CALC-SCNC: 3 MMOL/L (ref 3–18)
AST SERPL-CCNC: 19 U/L (ref 10–38)
BACTERIA SPEC CULT: NORMAL
BILIRUB SERPL-MCNC: 0.3 MG/DL (ref 0.2–1)
BUN SERPL-MCNC: 11 MG/DL (ref 7–18)
BUN/CREAT SERPL: 14 (ref 12–20)
CALCIUM SERPL-MCNC: 8.3 MG/DL (ref 8.5–10.1)
CHLORIDE SERPL-SCNC: 109 MMOL/L (ref 100–111)
CO2 SERPL-SCNC: 27 MMOL/L (ref 21–32)
CREAT SERPL-MCNC: 0.76 MG/DL (ref 0.6–1.3)
FAT STL QL: NORMAL
GLOBULIN SER CALC-MCNC: 2.9 G/DL (ref 2–4)
GLUCOSE SERPL-MCNC: 88 MG/DL (ref 74–99)
NEUTRAL FAT STL QL: NORMAL
POTASSIUM SERPL-SCNC: 3.6 MMOL/L (ref 3.5–5.5)
PROT SERPL-MCNC: 5.5 G/DL (ref 6.4–8.2)
SERVICE CMNT-IMP: NORMAL
SODIUM SERPL-SCNC: 139 MMOL/L (ref 136–145)

## 2024-10-04 PROCEDURE — 36415 COLL VENOUS BLD VENIPUNCTURE: CPT

## 2024-10-04 PROCEDURE — 6370000000 HC RX 637 (ALT 250 FOR IP): Performed by: STUDENT IN AN ORGANIZED HEALTH CARE EDUCATION/TRAINING PROGRAM

## 2024-10-04 PROCEDURE — 80053 COMPREHEN METABOLIC PANEL: CPT

## 2024-10-04 PROCEDURE — 2580000003 HC RX 258: Performed by: INTERNAL MEDICINE

## 2024-10-04 PROCEDURE — 6360000002 HC RX W HCPCS: Performed by: INTERNAL MEDICINE

## 2024-10-04 PROCEDURE — 6370000000 HC RX 637 (ALT 250 FOR IP): Performed by: INTERNAL MEDICINE

## 2024-10-04 PROCEDURE — 94761 N-INVAS EAR/PLS OXIMETRY MLT: CPT

## 2024-10-04 RX ORDER — DOXYCYCLINE HYCLATE 100 MG
100 TABLET ORAL 2 TIMES DAILY
Qty: 14 TABLET | Refills: 0 | Status: SHIPPED | OUTPATIENT
Start: 2024-10-04 | End: 2024-10-14

## 2024-10-04 RX ADMIN — SODIUM CHLORIDE, PRESERVATIVE FREE 10 ML: 5 INJECTION INTRAVENOUS at 09:19

## 2024-10-04 RX ADMIN — PANTOPRAZOLE SODIUM 40 MG: 40 INJECTION, POWDER, FOR SOLUTION INTRAVENOUS at 09:13

## 2024-10-04 RX ADMIN — SODIUM CHLORIDE, POTASSIUM CHLORIDE, SODIUM LACTATE AND CALCIUM CHLORIDE: 600; 310; 30; 20 INJECTION, SOLUTION INTRAVENOUS at 07:30

## 2024-10-04 RX ADMIN — LOPERAMIDE HYDROCHLORIDE 2 MG: 2 CAPSULE ORAL at 09:12

## 2024-10-04 RX ADMIN — LEVOFLOXACIN 500 MG: 500 TABLET, FILM COATED ORAL at 14:42

## 2024-10-04 RX ADMIN — WATER 2000 MG: 1 INJECTION INTRAMUSCULAR; INTRAVENOUS; SUBCUTANEOUS at 14:42

## 2024-10-04 ASSESSMENT — PAIN SCALES - GENERAL
PAINLEVEL_OUTOF10: 0

## 2024-10-04 NOTE — CARE COORDINATION
D/C order noted for today. Orders reviewed. No needs identified at this time. CM remains available if needed.     Leydi Chpaa, AUSTINN, RN   Case Management   [phone]

## 2024-10-04 NOTE — DISCHARGE SUMMARY
Discharge Summary    Patient: Arlene Fuentes MRN: [id number]  [facility]: [id number]    YOB: 1965  Age: 59 y.o.  Sex: female    DOA: 10/2/2024 LOS:  LOS: 2 days        Disposition: Home     Discharge Date: 10/4/2024    Admission Diagnosis: Enteritis [K52.9]  Generalized abdominal pain [R10.84]  Nausea vomiting and diarrhea [R11.2, R19.7]  Severe sepsis (HCC) [A41.9, R65.20]    Discharge Diagnosis:    Enteritis  Nausea, vomiting, diarrhea due to above resolved  Sepsis due to above, resolved  Lactic acidosis due to above, resolved  Hypotension, resolved  Small to moderate sliding-type hiatal hernia  Elevated bilirubin likely due to enteritis and continued vomiting  Cholelithiasis  Possible RUBINA, unknown baseline kidney function, resolved  Polycythemia likely related to volume depletion     Discharge Condition: Stable      PHYSICAL EXAM  Visit Vitals  /74   Pulse 78   Temp 98.2 °F (36.8 °C) (Oral)   Resp 16   Ht 1.651 m (5' 5\")   Wt 98.4 kg (216 lb 14.9 oz)   SpO2 95%   BMI 36.10 kg/m²       General: Alert, cooperative, no acute distress    HEENT: PERRLA, EOMI. Anicteric sclerae.  Lungs:  CTA Bilaterally. No Wheezing/Rales.  Heart:             Regular rate and Rhythm.  Abdomen: Soft, Non distended, Non tender. + Bowel sounds.  Extremities: No edema.  Psych:   Good insight. Not anxious or agitated.  Neurologic:  AA, oriented X 3. Moves all ext                                 Hospital Course:   59 y.o.  female with a past medical history of GERD, obesity who presents today for abdominal pain, nausea, vomiting and diarrhea.  Patient notes she is visiting from [place] where she receives all her medical care.  Patient reports she was staying at the [facility] and had oysters on 9/30/2024 and then she developed nausea vomiting diarrhea on 10/1/2024 in the afternoon.  Her symptoms have continued to progress and this is never happened before.  She reports no evidence of blood in the vomit or stool.  Patient

## 2024-10-04 NOTE — DISCHARGE INSTRUCTIONS
DISCHARGE SUMMARY from Nurse    PATIENT INSTRUCTIONS:    What to do at Home:  Recommended activity: activity as tolerated,    If you experience any of the following symptoms: worsening nausea, vomiting, or diarrhea, please follow up with your primary care provider or return to the ER.    *  Please give a list of your current medications to your Primary Care Provider.  *  Please update this list whenever your medications are discontinued, doses are      changed, or new medications (including over-the-counter products) are added.  *  Please carry medication information at all times in case of emergency situations.  These are general instructions for a healthy lifestyle:    No smoking/ No tobacco products/ Avoid exposure to second hand smoke  Surgeon General's Warning:  Quitting smoking now greatly reduces serious risk to your health.    Obesity, smoking, and sedentary lifestyle greatly increases your risk for illness   A healthy diet, regular physical exercise & weight monitoring are important for maintaining a healthy lifestyle.    The discharge information has been reviewed with the patient.  The patient verbalized understanding.  Discharge medications reviewed with the patient and appropriate educational materials and side effects teaching were provided.  Please continue to take Doxycycline until 10/14/2024. It is important to finish antibiotic even if you start to feel better.  Short course Prilosec sent to your pharmacy.  Follow with your PCP within 1 week.

## 2024-10-04 NOTE — PROGRESS NOTES
[facility]  (A Division of [facility] [place] [facility])      Consultation Note      Date of Admission: 10/2/2024    Date of Note: 10/4/2024      Reason for Referral: sepsis, enteritis  Referring Physician: Dr. Bryan Melvin from this admission:   10/2 blood cultures: No growth to date  10/2 C. difficile: Negative  10/2 enteric pathogen panel: + vibrio spp  10/2 urine culture: negative    Current Antimicrobials:    Prior Antimicrobials:  Ceftriaxone 10/2 to present  Levofloxacin 10/2 to present Zosyn 10/2       Assessment:         Severe sepsis with lactic acidosis: Likely GI in origin; lactic acid greater than 5, WBC 29.2  Small bowel enteritis due to Vibrio: 10/2 CT abdomen and pelvis shows long length of small bowel wall thickening and inflammatory changes spanning from the mid jejunum to the terminal ileum representing severe small bowel enteritis.  Cholelithiasis without cholecystitis, small volume ascites   -C. difficile negative as expected  RUBINA: Resolved .Presenting creatinine 1.44  Hyperbilirubinemia: 1.9 on presentation no associated jaundice    Plan:   Continue ceftriaxone + levofloxacin    - when ready for discahrge, transition to po doxycycline 100 mg po bid.   - stop date 10/14    Enteric pathogen panel + Vibrio spp    Trend CBC, CMP  Ok for discharge when tolerating po.    My partner Dr. Burch will be covering this weekend. Please call him at [phone] for any concerns.      Arnoldo Vázquez DO  [facility]  [address], [address], [place] [id number]  Office: [phone], Ext [phone]      Lines / Catheters:  Peripheral    Subjective:   Seen and examined.  No new fevers overnight. Feeling significantly better but still not great. No vomiting today. Tolerating liquid diet. Still having diarrhea but slowing down. Pain improved.    Tmax 98.9  WBC none today    HPI:  Ms. Fuentes is a 59-year-old female with a past medical history 
           [facility]  (A Division of [facility])      Consultation Note      Date of Admission: 10/2/2024    Date of Note: 10/3/2024      Reason for Referral: sepsis, enteritis  Referring Physician: Dr. Bryan Melvin from this admission:   10/2 blood cultures: No growth to date  10/2 C. difficile: Negative  10/2 enteric pathogen panel: Pending  10/2 urine culture: In progress    Current Antimicrobials:    Prior Antimicrobials:  Ceftriaxone 10/2 to present  Levofloxacin 10/2 to present Zosyn 10/2       Assessment:         Severe sepsis with lactic acidosis: Likely GI in origin; lactic acid greater than 5, WBC 29.2  Small bowel enteritis: 10/2 CT abdomen and pelvis shows long length of small bowel wall thickening and inflammatory changes spanning from the mid jejunum to the terminal ileum representing severe small bowel enteritis.  Cholelithiasis without cholecystitis, small volume ascites   -C. difficile negative as expected  RUBINA: Resolved .Presenting creatinine 1.44  Hyperbilirubinemia: 1.9 on presentation no associated jaundice    Plan:   Continue ceftriaxone + levofloxacin presuming possible vibrio from ingesting raw oysters preceeding current admission.    Enteric pathogen panel is still pending    Trend CBC, CMP    Surgery has been consulted- no plans for surgical intervention.    Discussed with Dr. Bryan Vázquez,   [facility]  [address], [address], [place] [address]  Office: [phone], Ext [phone]      Lines / Catheters:  Peripheral    Subjective:   Seen and examined.  No new fevers overnight. Feeling significantly better. No vomiting today. Tolerating liquid diet. Still having diarrhea but slowing down. Pain moderately improved.    Tmax 98.9  WBC 17.4    HPI:  Ms. Fuentes is a 59-year-old female with a past medical history of GERD and obesity who is coming into the emergency department with a rather sudden 
 completed the initial Spiritual Assessment of the patient, and offered Pastoral Care support to the patient in bed 6 of the emergency room where she will be admitted to the hospital due to sepsis and possible c-diff., There is no advance directive on file.  Unsure if patient is involved in a local Protestant. Patient does not have any Jewish/cultural needs that will affect patient’s preferences in health care. Chaplains will continue to follow and will provide pastoral care on an as needed/requested basis.    Spiritual Health History and Assessment/Progress Note  [facility]    Crisis (iv-sa-eje), Follow up,  ,      Name: Arlene Fuentes MRN: [id number]    Age: 59 y.o.     Sex: female   Language: English   Muslim: None   <principal problem not specified>     Date: 10/2/2024            Total Time Calculated: 7 min              Spiritual Assessment began in [facility] EMERGENCY DEPT        Referral/Consult From: Rounding   Encounter Overview/Reason: Crisis (iv-sa-eje)  Service Provided For: Patient (new admit   septic)    Tory, Belief, Meaning:   Patient unable to assess at this time  Family/Friends No family/friends present      Importance and Influence:  Patient unable to assess at this time  Family/Friends No family/friends present    Community:  Patient Other: not able to communicate now  Family/Friends No family/friends present    Assessment and Plan of Care:     Patient Interventions include: Other: not able to communicate now.  Family/Friends Interventions include: No family/friends present    Patient Plan of Care: Other: not able to communicate now.  Family/Friends Plan of Care: No family/friends present    Electronically signed by Stephen Borges Jr., [facility] on 10/2/2024 at 10:44 AM   
 met patient at bedside,    Patient said she was in the hospital because she ate a bad oyster. She came to the hospital with nausea, vomiting, diarrhea, and cramping. She had several friends with her eating oysters that did not get sick. Pat is feeling better and hopes to go home soon.     provided presence and support for patient.    Chaplains will provide follow-up care for patient and family as needed.    Spiritual Health History and Assessment/Progress Note  [facility]    Loneliness/Social Isolation, Follow up,  ,      Name: Arlene Fuentes MRN: [id number]    Age: 59 y.o.     Sex: female   Language: English   Congregation: None   Severe sepsis (HCC)     Date: 10/4/2024            Total Time Calculated: 10 min              Spiritual Assessment began in [address] MEDICAL        Referral/Consult From: Nurse   Encounter Overview/Reason: Loneliness/Social Isolation  Service Provided For: Patient    Tory, Belief, Meaning:   Patient Other: Patient said she is Atheist.  Family/Friends No family/friends present      Importance and Influence:  Patient has no beliefs influential to healthcare decision-making identified during this visit  Family/Friends No family/friends present    Community:  Patient feels well-supported. Support system includes: Friends and Extended family  Family/Friends No family/friends present    Assessment and Plan of Care:     Patient Interventions include: Affirmed coping skills/support systems  Family/Friends Interventions include: No family/friends present    Patient Plan of Care: No spiritual needs identified for follow-up  Family/Friends Plan of Care: No spiritual needs identified for follow-up    Electronically signed by Chaplain Miguelangel on 10/4/2024 at 12:03 PM   
1830: PT arrived via transport from [facility] ED Alert and oriented with no complaints voiced.  VSS.      1905: Bedside shift report given to BRENDEN Ballard.  Dual skin assessment performed at this time  
4 Eyes Skin Assessment     NAME:  Arlene Fuentes  YOB: 1965  MEDICAL RECORD NUMBER:  [id number]    The patient is being assessed for  Admission    I agree that at least one RN has performed a thorough Head to Toe Skin Assessment on the patient. ALL assessment sites listed below have been assessed.      Areas assessed by both nurses:    Head, Face, Ears, Shoulders, Back, Chest, Arms, Elbows, Hands, Sacrum. Buttock, Coccyx, Ischium, Legs. Feet and Heels, and Under Medical Devices         Does the Patient have a Wound? No noted wound(s)       Reese Prevention initiated by RN: No  Wound Care Orders initiated by RN: No    Pressure Injury (Stage 3,4, Unstageable, DTI, NWPT, and Complex wounds) if present, place Wound referral order by RN under : No    New Ostomies, if present place, Ostomy referral order under : No     Nurse 1 eSignature: Electronically signed by Shannon Greene RN on 10/2/24 at 7:18 PM EDT    **SHARE this note so that the co-signing nurse can place an eSignature**    Nurse 2 eSignature: Electronically signed by Nellie Mario RN on 10/3/24 at 9:13 AM EDT   
4 Eyes Skin Assessment     NAME:  Arlene Fuentes  YOB: 1965  MEDICAL RECORD NUMBER:  [id number]    The patient is being assessed for  Shift Handoff    I agree that at least one RN has performed a thorough Head to Toe Skin Assessment on the patient. ALL assessment sites listed below have been assessed.      Areas assessed by both nurses:    Head, Face, Ears, Shoulders, Back, Chest, Arms, Elbows, Hands, Sacrum. Buttock, Coccyx, Ischium, Legs. Feet and Heels, and Under Medical Devices         Does the Patient have a Wound? No noted wound(s)       Reese Prevention initiated by RN: No  Wound Care Orders initiated by RN: No    Pressure Injury (Stage 3,4, Unstageable, DTI, NWPT, and Complex wounds) if present, place Wound referral order by RN under : No    New Ostomies, if present place, Ostomy referral order under : No     Nurse 1 eSignature: Electronically signed by WENDY DOMINGUEZ RN on 10/3/24 at 7:59 PM EDT    **SHARE this note so that the co-signing nurse can place an eSignature**    Nurse 2 eSignature: {Esignature:[id number]}    
4 Eyes Skin Assessment     NAME:  Arlene Fuentes  YOB: 1965  MEDICAL RECORD NUMBER:  [id number]    The patient is being assessed for  Shift Handoff    I agree that at least one RN has performed a thorough Head to Toe Skin Assessment on the patient. ALL assessment sites listed below have been assessed.      Areas assessed by both nurses:    Head, Face, Ears, Shoulders, Back, Chest, Arms, Elbows, Hands, Sacrum. Buttock, Coccyx, Ischium, Legs. Feet and Heels, and Under Medical Devices         Does the Patient have a Wound? No noted wound(s)       Reese Prevention initiated by RN: No  Wound Care Orders initiated by RN: No    Pressure Injury (Stage 3,4, Unstageable, DTI, NWPT, and Complex wounds) if present, place Wound referral order by RN under : No    New Ostomies, if present place, Ostomy referral order under : No   Pt's skin appears flushed ans red this morning    Nurse 1 eSignature: Electronically signed by Nellie Mario RN on 10/3/24 at 8:24 AM EDT    **SHARE this note so that the co-signing nurse can place an eSignature**    Nurse 2 eSignature: {Esignature:[id number]}   
4 Eyes Skin Assessment     NAME:  Arlene Fuentes  YOB: 1965  MEDICAL RECORD NUMBER:  [id number]    The patient is being assessed for  Shift Handoff    I agree that at least one RN has performed a thorough Head to Toe Skin Assessment on the patient. ALL assessment sites listed below have been assessed.      Areas assessed by both nurses:    Head, Face, Ears, Shoulders, Back, Chest, Arms, Elbows, Hands, Sacrum. Buttock, Coccyx, Ischium, and Legs. Feet and Heels        Does the Patient have a Wound? No noted wound(s)       Reese Prevention initiated by RN: No  Wound Care Orders initiated by RN: No    Pressure Injury (Stage 3,4, Unstageable, DTI, NWPT, and Complex wounds) if present, place Wound referral order by RN under : No    New Ostomies, if present place, Ostomy referral order under : No     Nurse 1 eSignature: Electronically signed by CHINMAY WOMACK RN on 10/4/24 at 7:33 AM EDT    **SHARE this note so that the co-signing nurse can place an eSignature**    Nurse 2 eSignature: Electronically signed by Jazmyn Mahajan RN on 10/4/24 at 7:35 AM EDT eye  
Assumed are of patient during bedside report with Angela WILCOX-- 4 eyes skin check-- patient able to ambulate to bathroom with stand by assist-- no concerns at this time -- new Iv fluid bag will be needed soon     20 gauge iv site found in right AC-- switched fluid to that site patient complaining of left 18g (ultra sound guide) hurting     Patient refused Bentyl and Reglan stating she is feeling much better -- \"lets try things with out the medications, can I have them later if I fabiola them\"  patient was told Buck was scheduled again @ 2am and Cali @ 7am.  
[facility] Hospital[facility]  Progress Note    Patient: Arlene Fuentes Age: 59 y.o. : 1965 MR#: [id number] SSN: [id number]  Date/Time: 10/3/2024     Subjective:   Reports having nausea though improving. She is tolerating liquids okay, no further emesis this AM. Denies additional pain or shortness of breath. Endorses having diarrhea. Explained C-diff testing negative.     Assessment/Plan:   Enteritis  Nausea, vomiting, diarrhea due to above likely  Sepsis due to above, resolved  Lactic acidosis due to above, resolved  Hypotension  -Improving, suspect secondary to dehydration/sepsis. Continue IV fluids. S/p multiple liters fluids.   -ID following. Continue Levaquin and Rocephin. Follow blood and urine cultures, enteric panel with stool studies. Negative C-diff testing and fecal leukocytes. PRN imodium for diarrhea.  -General surgery following, no plans for surgical intervention. Advance diet as tolerated. PRN antiemetics.   -PT/OT eval.    Small to moderate sliding-type hiatal hernia  Elevated bilirubin likely due to enteritis and continued vomiting  Cholelithiasis  Possible RUBINA, unknown baseline kidney function  -Creatinine improving UA with 2+ bacteria, patient denies dysuria, hematuria. On levaquin and Rocephin as above.   -IV fluids as above. if kidney function worsens could consider renal ultrasound  Polycythemia likely related to volume depletion     Dispo plan: Home with home health care once medically stable, anticipated discharge date 10/04-10/05    I spent 50 minutes with the patient in face-to-face consultation, of which greater than 50% was spent in counseling and coordination of care as described above.    Case discussed with:  [x]Patient  [x]Family  [x]Nursing  [x]Case Management  DVT Prophylaxis:  [x]Lovenox  []Hep SQ  []SCDs  []Coumadin   []Eliquis/Xarelto     Objective:   VS: /71   Pulse 75   Temp 98.1 °F (36.7 °C) (Oral)   Resp 18   Ht 1.651 m (5' 5\")   Wt 
MD paged for lactic acid of 3.0 ordered bolus of LR.Orders carried out.  
New orders received and chart reviewed.   Spoke with pt in room regarding participation in ADLs and functional mobility. Pt reports she has been walking to the BR w/o AD and is able to care for herself w/o difficulty.   Pt denies any OT needs at this time.  Formal OT evaluation is not indicated due to pt being at functional baseline for basic ADLs and functional txfrs. We will discontinue current OT orders.  Thank you for this referral.    Rocio Simms MS, OTR/L  
PT orders received and chart reviewed. Witnessed patient returning from bathroom and transferred to bed with RN present. No mobility concerns. Discussed role of PT with patient. Denies mobility concerns. Formal PT evaluation not indicated. Discontinuing PT orders.   
 10/03/2024 01:18 AM    K 3.8 10/03/2024 01:18 AM     10/03/2024 01:18 AM    CO2 25 10/03/2024 01:18 AM    BUN 25 10/03/2024 01:18 AM    GLOB 3.1 10/03/2024 01:18 AM       Recent Results (from the past 24 hour(s))   Clostridium difficile toxin/antigen    Collection Time: 10/02/24 12:42 PM    Specimen: Stool   Result Value Ref Range    GDH Antigen Negative NEG      C difficile Toxin, EIA Negative NEG      C Diff Toxin Interpretation NEGATIVE FOR TOXIGENIC C. DIFFICILE NTXCD     Lactic Acid    Collection Time: 10/02/24  1:57 PM   Result Value Ref Range    Lactic Acid, Plasma 4.5 (HH) 0.4 - 2.0 MMOL/L   Lactic Acid    Collection Time: 10/02/24  3:57 PM   Result Value Ref Range    Lactic Acid, Plasma 2.5 (HH) 0.4 - 2.0 MMOL/L   Lactic Acid    Collection Time: 10/02/24  9:40 PM   Result Value Ref Range    Lactic Acid, Plasma 3.0 (HH) 0.4 - 2.0 MMOL/L   Urinalysis    Collection Time: 10/02/24 10:00 PM   Result Value Ref Range    Color, UA DARK YELLOW      Appearance CLEAR      Specific Gravity, UA >1.030 (H) 1.003 - 1.030    pH, Urine 5.0 5.0 - 8.0      Protein, UA 30 (A) NEG mg/dL    Glucose, Ur Negative NEG mg/dL    Ketones, Urine Negative NEG mg/dL    Bilirubin, Urine Negative NEG      Blood, Urine Negative NEG      Urobilinogen, Urine 0.2 0.2 - 1.0 EU/dL    Nitrite, Urine Negative NEG      Leukocyte Esterase, Urine Negative NEG     Urinalysis, Micro    Collection Time: 10/02/24 10:00 PM   Result Value Ref Range    WBC, UA 1 to 4 0 - 4 /hpf    RBC, UA 1 to 4 0 - 5 /hpf    Epithelial Cells, UA 2+ 0 - 5 /lpf    BACTERIA, URINE 2+ (A) NEG /hpf   Lactic Acid    Collection Time: 10/03/24  1:18 AM   Result Value Ref Range    Lactic Acid, Plasma 1.6 0.4 - 2.0 MMOL/L   CBC with Auto Differential    Collection Time: 10/03/24  1:18 AM   Result Value Ref Range    WBC 17.4 (H) 4.6 - 13.2 K/uL    RBC 4.53 4.20 - 5.30 M/uL    Hemoglobin 13.9 12.0 - 16.0 g/dL    Hematocrit 41.1 35.0 - 45.0 %    MCV 90.7 78.0 - 100.0 FL    
2.9 10/04/2024 12:30 AM       Recent Results (from the past 24 hour(s))   Lactic Acid    Collection Time: 10/03/24 12:39 PM   Result Value Ref Range    Lactic Acid, Plasma 2.0 0.4 - 2.0 MMOL/L   Comprehensive Metabolic Panel w/ Reflex to MG    Collection Time: 10/04/24 12:30 AM   Result Value Ref Range    Sodium 139 136 - 145 mmol/L    Potassium 3.6 3.5 - 5.5 mmol/L    Chloride 109 100 - 111 mmol/L    CO2 27 21 - 32 mmol/L    Anion Gap 3 3.0 - 18 mmol/L    Glucose 88 74 - 99 mg/dL    BUN 11 7.0 - 18 MG/DL    Creatinine 0.76 0.6 - 1.3 MG/DL    BUN/Creatinine Ratio 14 12 - 20      Est, Glom Filt Rate >90 >60 ml/min/1.73m2    Calcium 8.3 (L) 8.5 - 10.1 MG/DL    Total Bilirubin 0.3 0.2 - 1.0 MG/DL    ALT 34 13 - 56 U/L    AST 19 10 - 38 U/L    Alk Phosphatase 56 45 - 117 U/L    Total Protein 5.5 (L) 6.4 - 8.2 g/dL    Albumin 2.6 (L) 3.4 - 5.0 g/dL    Globulin 2.9 2.0 - 4.0 g/dL    Albumin/Globulin Ratio 0.9 0.8 - 1.7         images and reports reviewed    Assessment:   Arlene Fuentes is a 59 y.o. female who we are following for evaluation of abdominal pain.  The patient is doing much better and she is tolerating diet.     Plan:     No need for any general surgery intervention  I will sign off for now    Please call me if you have any questions (cell phone: [phone])     Signed By: Pamela Yancey MD     October 4, 2024

## 2024-10-04 NOTE — PLAN OF CARE
Problem: Discharge Planning  Goal: Discharge to home or other facility with appropriate resources  Outcome: Progressing  Flowsheets (Taken 10/3/2024 2030)  Discharge to home or other facility with appropriate resources:   Identify barriers to discharge with patient and caregiver   Refer to discharge planning if patient needs post-hospital services based on physician order or complex needs related to functional status, cognitive ability or social support system     Problem: Pain  Goal: Verbalizes/displays adequate comfort level or baseline comfort level  Outcome: Progressing     Problem: Skin/Tissue Integrity  Goal: Absence of new skin breakdown  Description: 1.  Monitor for areas of redness and/or skin breakdown  2.  Assess vascular access sites hourly  3.  Every 4-6 hours minimum:  Change oxygen saturation probe site  4.  Every 4-6 hours:  If on nasal continuous positive airway pressure, respiratory therapy assess nares and determine need for appliance change or resting period.  Outcome: Progressing     Problem: Safety - Adult  Goal: Free from fall injury  Outcome: Progressing     Problem: Gastrointestinal - Adult  Goal: Minimal or absence of nausea and vomiting  10/3/2024 2237 by Kristi Ivan RN  Outcome: Progressing  Flowsheets (Taken 10/3/2024 2030)  Minimal or absence of nausea and vomiting:   Administer IV fluids as ordered to ensure adequate hydration   Administer ordered antiemetic medications as needed  10/3/2024 1357 by Angela Yeh, RN  Outcome: Progressing  Flowsheets (Taken 10/3/2024 1357)  Minimal or absence of nausea and vomiting:   Administer IV fluids as ordered to ensure adequate hydration   Administer ordered antiemetic medications as needed   Advance diet as tolerated, if ordered  Note: Monitor intake and output    Goal: Maintains or returns to baseline bowel function  Outcome: Progressing  Flowsheets (Taken 10/3/2024 2030)  Maintains or returns to baseline bowel function:   Assess

## 2024-10-06 LAB
BACTERIA SPEC CULT: NORMAL
BACTERIA SPEC CULT: NORMAL
SERVICE CMNT-IMP: NORMAL
SERVICE CMNT-IMP: NORMAL

## 2024-10-07 LAB — OSMOLALITY STL: 379 MOSMOL/KG
